# Patient Record
Sex: FEMALE | Race: BLACK OR AFRICAN AMERICAN | NOT HISPANIC OR LATINO | Employment: UNEMPLOYED | URBAN - METROPOLITAN AREA
[De-identification: names, ages, dates, MRNs, and addresses within clinical notes are randomized per-mention and may not be internally consistent; named-entity substitution may affect disease eponyms.]

---

## 2018-03-08 ENCOUNTER — APPOINTMENT (OUTPATIENT)
Dept: GENERAL RADIOLOGY | Facility: HOSPITAL | Age: 28
End: 2018-03-08

## 2018-03-08 ENCOUNTER — HOSPITAL ENCOUNTER (EMERGENCY)
Facility: HOSPITAL | Age: 28
Discharge: HOME | End: 2018-03-08
Attending: EMERGENCY MEDICINE | Admitting: EMERGENCY MEDICINE

## 2018-03-08 VITALS
SYSTOLIC BLOOD PRESSURE: 150 MMHG | BODY MASS INDEX: 35.44 KG/M2 | WEIGHT: 200 LBS | RESPIRATION RATE: 16 BRPM | OXYGEN SATURATION: 97 % | DIASTOLIC BLOOD PRESSURE: 97 MMHG | HEIGHT: 63 IN | TEMPERATURE: 98.9 F | HEART RATE: 80 BPM

## 2018-03-08 DIAGNOSIS — S93.401A SPRAIN OF RIGHT ANKLE, UNSPECIFIED LIGAMENT, INITIAL ENCOUNTER: Primary | ICD-10-CM

## 2018-03-08 PROCEDURE — 99283 EMERGENCY DEPT VISIT LOW MDM: CPT

## 2018-03-09 NOTE — ED NOTES
Pt states that she twisted her ankle yesterday.   Pt has swelling to the lateral side of her right ankle.   Pt is able to move her toes and denies numbness or tingling.   Pedal pulse is 2+.     Huseyin Miller RN  03/08/18 1946

## 2018-03-09 NOTE — ED PROVIDER NOTES
" EMERGENCY DEPARTMENT ENCOUNTER    CHIEF COMPLAINT  Chief Complaint: Ankle swelling  History given by: Pt  History limited by: Nothing  Room Number: 46/46  PMD: No Known Provider      HPI:  Pt is a 27 y.o. female who presents complaining of right ankle swelling that began yesterday when she twisted her ankle.  Pt states that she has been able to walk on her ankle.   Pt declined ankle XR.    Duration:  One day  Onset: Sudden  Timing: Constant  Location: Right ankle  Radiation: None  Quality: \"Pain\"  Intensity/Severity: Moderate  Progression: Unchanged  Associated Symptoms: None  Aggravating Factors: Walking  Alleviating Factors: None  Previous Episodes: None  Treatment before arrival: None    PAST MEDICAL HISTORY  Active Ambulatory Problems     Diagnosis Date Noted   • No Active Ambulatory Problems     Resolved Ambulatory Problems     Diagnosis Date Noted   • No Resolved Ambulatory Problems     Past Medical History:   Diagnosis Date   • Asthma        PAST SURGICAL HISTORY  History reviewed. No pertinent surgical history.    FAMILY HISTORY  History reviewed. No pertinent family history.    SOCIAL HISTORY  Social History     Social History   • Marital status: Single     Spouse name: N/A   • Number of children: N/A   • Years of education: N/A     Occupational History   • Not on file.     Social History Main Topics   • Smoking status: Never Smoker   • Smokeless tobacco: Never Used   • Alcohol use No   • Drug use: No   • Sexual activity: Defer     Other Topics Concern   • Not on file     Social History Narrative   • No narrative on file       ALLERGIES  Review of patient's allergies indicates no known allergies.    REVIEW OF SYSTEMS  Review of Systems   Constitutional: Negative for fever.   Respiratory: Negative for shortness of breath.    Cardiovascular: Negative for chest pain.   Musculoskeletal:        Right ankle pain       PHYSICAL EXAM  ED Triage Vitals   Temp Heart Rate Resp BP SpO2   03/08/18 1920 03/08/18 1920 " 03/08/18 1920 03/08/18 1918 03/08/18 1920   98.9 °F (37.2 °C) 80 16 150/97 97 %      Temp src Heart Rate Source Patient Position BP Location FiO2 (%)   03/08/18 1920 -- -- -- --   Tympanic           Physical Exam   Constitutional: She is oriented to person, place, and time and well-developed, well-nourished, and in no distress. No distress.   Eyes: EOM are normal.   Musculoskeletal:   There is mild swelling of the lateral malleolus.  Her [place] ankle rules are negative.    Neurological: She is alert and oriented to person, place, and time. She has normal sensation and normal strength. GCS score is 15.   Skin: Skin is warm and dry.   Psychiatric: Affect normal.   Nursing note and vitals reviewed.          PROCEDURES  Procedures      PROGRESS AND CONSULTS  ED Course     1956  Discussed plan to discharge.  Instructed Pt to wear supportive footwear and keep foot elevated.  Pt understands and agrees with the plan, all questions answered.      MEDICAL DECISION MAKING  Results were reviewed/discussed with the patient and they were also made aware of online access. Pt also made aware that some labs, such as cultures, will not be resulted during ER visit and follow up with PMD is necessary.     MDM       DIAGNOSIS  Final diagnoses:   Sprain of right ankle, unspecified ligament, initial encounter       DISPOSITION  DISCHARGE    Patient discharged in stable condition.    Reviewed implications of results, diagnosis, meds, responsibility to follow up, warning signs and symptoms of possible worsening, potential complications and reasons to return to ER.    Patient/Family voiced understanding of above instructions.    Discussed plan for discharge, as there is no emergent indication for admission.  Pt/family is agreeable and understands need for follow up and repeat testing.  Pt is aware that discharge does not mean that nothing is wrong but it indicates no emergency is present that requires admission and they must continue care  with follow-up as given below or physician of their choice.     FOLLOW-UP  Roshan Matias Jr., MD  [address] [facility] [address]  [facility] [id number] [phone]    Call  If symptoms worsen, As needed    [facility]  [address] [facility] [address] [phone]    If symptoms worsen         Medication List      Notice     No changes were made to your prescriptions during this visit.            Latest Documented Vital Signs:  As of 8:11 PM  BP- 150/97 HR- 80 Temp- 98.9 °F (37.2 °C) (Tympanic) O2 sat- 97%    --  Documentation assistance provided by mariel Hinkle for Dr. Renee.  Information recorded by the scribe was done at my direction and has been verified and validated by me.     Angie Hinkle  03/08/18 2012       Maximo Renee MD  03/08/18 2222       Maximo Renee MD  03/08/18 2226